# Patient Record
Sex: FEMALE | Race: WHITE | Employment: OTHER | URBAN - METROPOLITAN AREA
[De-identification: names, ages, dates, MRNs, and addresses within clinical notes are randomized per-mention and may not be internally consistent; named-entity substitution may affect disease eponyms.]

---

## 2017-06-26 ENCOUNTER — OFFICE VISIT (OUTPATIENT)
Dept: URGENT CARE | Facility: URGENT CARE | Age: 32
End: 2017-06-26
Payer: COMMERCIAL

## 2017-06-26 VITALS
DIASTOLIC BLOOD PRESSURE: 75 MMHG | TEMPERATURE: 98.2 F | SYSTOLIC BLOOD PRESSURE: 116 MMHG | WEIGHT: 195 LBS | HEART RATE: 91 BPM | BODY MASS INDEX: 34.21 KG/M2

## 2017-06-26 DIAGNOSIS — N39.0 URINARY TRACT INFECTION WITHOUT HEMATURIA, SITE UNSPECIFIED: Primary | ICD-10-CM

## 2017-06-26 DIAGNOSIS — R30.0 DYSURIA: ICD-10-CM

## 2017-06-26 DIAGNOSIS — R82.90 NONSPECIFIC FINDING ON EXAMINATION OF URINE: ICD-10-CM

## 2017-06-26 LAB
ALBUMIN UR-MCNC: NEGATIVE MG/DL
APPEARANCE UR: CLEAR
BACTERIA #/AREA URNS HPF: ABNORMAL /HPF
BILIRUB UR QL STRIP: NEGATIVE
COLOR UR AUTO: YELLOW
GLUCOSE UR STRIP-MCNC: NEGATIVE MG/DL
HGB UR QL STRIP: NEGATIVE
KETONES UR STRIP-MCNC: NEGATIVE MG/DL
LEUKOCYTE ESTERASE UR QL STRIP: NEGATIVE
NITRATE UR QL: POSITIVE
NON-SQ EPI CELLS #/AREA URNS LPF: ABNORMAL /LPF
PH UR STRIP: 6 PH (ref 5–7)
RBC #/AREA URNS AUTO: ABNORMAL /HPF (ref 0–2)
SP GR UR STRIP: 1.02 (ref 1–1.03)
URN SPEC COLLECT METH UR: ABNORMAL
UROBILINOGEN UR STRIP-ACNC: 0.2 EU/DL (ref 0.2–1)
WBC #/AREA URNS AUTO: ABNORMAL /HPF (ref 0–2)

## 2017-06-26 PROCEDURE — 87088 URINE BACTERIA CULTURE: CPT | Performed by: PHYSICIAN ASSISTANT

## 2017-06-26 PROCEDURE — 87186 SC STD MICRODIL/AGAR DIL: CPT | Performed by: PHYSICIAN ASSISTANT

## 2017-06-26 PROCEDURE — 99213 OFFICE O/P EST LOW 20 MIN: CPT | Performed by: PHYSICIAN ASSISTANT

## 2017-06-26 PROCEDURE — 81001 URINALYSIS AUTO W/SCOPE: CPT | Performed by: PHYSICIAN ASSISTANT

## 2017-06-26 PROCEDURE — 87086 URINE CULTURE/COLONY COUNT: CPT | Performed by: PHYSICIAN ASSISTANT

## 2017-06-26 RX ORDER — NITROFURANTOIN 25; 75 MG/1; MG/1
100 CAPSULE ORAL 2 TIMES DAILY
Qty: 10 CAPSULE | Refills: 0 | Status: SHIPPED | OUTPATIENT
Start: 2017-06-26 | End: 2017-07-03

## 2017-06-26 NOTE — PROGRESS NOTES
SUBJECTIVE:                                                    Monica Foley is a 32 year old female who presents to clinic today for the following health issues:      URINARY TRACT SYMPTOMS      Duration: X 2 weeks    Description  frequency, odor and burning when urinating    Intensity:  moderate    Accompanying signs and symptoms:  Fever/chills: no   Flank pain no   Nausea and vomiting: no   Vaginal symptoms: itching  Abdominal/Pelvic Pain: YES    History  History of frequent UTI's: YES, has had kidney infections as child  History of kidney stones: no   Sexually Active: YES  Possibility of pregnancy: No    Precipitating or alleviating factors: None    Therapies tried and outcome: none      CC: dysuria    SUBJECTIVE:   Monica Foley is a 32 year old female who  presents today for a possible UTI.   Symptoms of urgency, frequency, burning and nausea have been going on for 3day(s).    Characteristics include: gradual onset and moderate pain.    Hematuria no.    There is no history of fever, chills, or vomiting.    She has mild abdominal pain.  Patient denies flank pain and Vomiting, significant nausea or diarrhea or vaginal discharge     Burning started over the weekend.    Urine has an odor.  No concern for STI  Abdominal pain - she has known ovarian cysts as well, so not sure if it is related.       Past Medical History:   Diagnosis Date     RISHI (generalised anxiety disorder)      Grief 2014      suddenly from bee sting allergy     Current Outpatient Prescriptions   Medication Sig Dispense Refill     PARoxetine (PAXIL) 30 MG tablet Take 0.5 tablets (15 mg) by mouth every morning 45 tablet 3     acetaminophen-codeine (TYLENOL #3) 300-30 MG per tablet Take 1 tablet by mouth every 4 hours as needed for pain (Patient not taking: Reported on 2017) 18 tablet 0     Social History   Substance Use Topics     Smoking status: Current Every Day Smoker     Packs/day: 0.50      Types: Cigarettes     Smokeless tobacco: Never Used     Alcohol use Yes      Comment: occasional       ROS:  As in HPI    OBJECTIVE:  /75  Pulse 91  Temp 98.2  F (36.8  C) (Oral)  Wt 195 lb (88.5 kg)  BMI 34.21 kg/m2  GENERAL APPEARANCE: healthy, alert and no distress  RESP: lungs clear to auscultation - no rales, rhonchi or wheezes  CV: regular rates and rhythm, normal S1 S2, no murmur noted  ABDOMEN: soft, tenderness mild suprapubic, no guarding, no rebound  BACK: No CVA tenderness  SKIN: no suspicious lesions or rashes    ASSESSMENT/PLAN:   Urinary tract infection without hematuria, site unspecified  - nitrofurantoin, macrocrystal-monohydrate, (MACROBID) 100 MG capsule; Take 1 capsule (100 mg) by mouth 2 times daily for 7 days  Dispense: 10 capsule; Refill: 0  - Urine Culture Aerobic Bacterial - will call patient if bacteria is resistant to nitrofurantoin.     As per ordered above  Drink plenty of fluids.    Prevention and treatment of UTI's discussed.  Signs and symptoms of pyelonephritis mentioned.    Follow up with primary care physician if not improving, or if symptoms return after antibiotic is completed.     Camila Damian PA-C

## 2017-06-26 NOTE — NURSING NOTE
"Chief Complaint   Patient presents with     Dysuria       Initial /75  Pulse 91  Temp 98.2  F (36.8  C) (Oral)  Wt 195 lb (88.5 kg)  BMI 34.21 kg/m2 Estimated body mass index is 34.21 kg/(m^2) as calculated from the following:    Height as of 2/19/16: 5' 3.31\" (1.608 m).    Weight as of this encounter: 195 lb (88.5 kg).  Medication Reconciliation: complete   Emily Polk CMA      "

## 2017-06-26 NOTE — MR AVS SNAPSHOT
After Visit Summary   6/26/2017    Monica Foley    MRN: [id number]           Patient Information     Date Of Birth          1985        Visit Information        Provider Department      6/26/2017 5:05 PM Camila Damian PA-C [facility]        Today's Diagnoses     Urinary tract infection without hematuria, site unspecified    -  1    Dysuria        Nonspecific finding on examination of urine           Follow-ups after your visit        Who to contact     If you have questions or need follow up information about today's clinic visit or your schedule please contact [facility] directly at [phone].  Normal or non-critical lab and imaging results will be communicated to you by Kinematixhart, letter or phone within 4 business days after the clinic has received the results. If you do not hear from us within 7 days, please contact the clinic through Kinematixhart or phone. If you have a critical or abnormal lab result, we will notify you by phone as soon as possible.  Submit refill requests through [employer] or call your pharmacy and they will forward the refill request to us. Please allow 3 business days for your refill to be completed.          Additional Information About Your Visit        MyChart Information     [employer] gives you secure access to your electronic health record. If you see a primary care provider, you can also send messages to your care team and make appointments. If you have questions, please call your primary care clinic.  If you do not have a primary care provider, please call [phone] and they will assist you.        Care EveryWhere ID     This is your Care EveryWhere ID. This could be used by other organizations to access your [facility] medical records  [id number]        Your Vitals Were     Pulse Temperature BMI (Body Mass Index)             91 98.2  F (36.8  C) (Oral) 34.21 kg/m2          Blood Pressure from Last 3 Encounters:    06/26/17 116/75   12/04/16 121/71   02/19/16 104/66    Weight from Last 3 Encounters:   06/26/17 195 lb (88.5 kg)   12/04/16 193 lb 6.4 oz (87.7 kg)   02/19/16 186 lb 12.8 oz (84.7 kg)              We Performed the Following     *UA reflex to Microscopic and Culture ([place] and [facility] (except [place] and [place])     Urine Culture Aerobic Bacterial     Urine Microscopic          Today's Medication Changes          These changes are accurate as of: 6/26/17  6:23 PM.  If you have any questions, ask your nurse or doctor.               Start taking these medicines.        Dose/Directions    nitrofurantoin (macrocrystal-monohydrate) 100 MG capsule   Commonly known as:  MACROBID   Used for:  Urinary tract infection without hematuria, site unspecified        Dose:  100 mg   Take 1 capsule (100 mg) by mouth 2 times daily for 7 days   Quantity:  10 capsule   Refills:  0            Where to get your medicines      These medications were sent to [facility] [address] [place] [address] [address] [facility]  [address] [facility] [address]     Phone:  [phone]     nitrofurantoin (macrocrystal-monohydrate) 100 MG capsule                Primary Care Provider    [facility] Other Clinic                Equal Access to Services     BARBIE MALAVE AH: Donal antoineo Sorichali, waaxda luqadaha, qaybta kaalmada adeegyada, roberta contreras. So [facility] [phone].    ATENCIÓN: Si habla español, tiene a espinoza disposición servicios gratuitos de asistencia lingüística. Llame al [phone].    We comply with applicable federal civil rights laws and [place] laws. We do not discriminate on the basis of race, color, national origin, age, disability sex, sexual orientation or gender identity.            Thank you!     Thank you for choosing [facility]  for your care. Our goal is always to provide you with excellent care. Hearing back from our patients is one way we can continue to improve  our services. Please take a few minutes to complete the written survey that you may receive in the mail after your visit with us. Thank you!             Your Updated Medication List - Protect others around you: Learn how to safely use, store and throw away your medicines at www.disposemymeds.org.          This list is accurate as of: 6/26/17  6:23 PM.  Always use your most recent med list.                   Brand Name Dispense Instructions for use Diagnosis    acetaminophen-codeine 300-30 MG per tablet    TYLENOL #3    18 tablet    Take 1 tablet by mouth every 4 hours as needed for pain    Acute left-sided low back pain with left-sided sciatica       nitrofurantoin (macrocrystal-monohydrate) 100 MG capsule    MACROBID    10 capsule    Take 1 capsule (100 mg) by mouth 2 times daily for 7 days    Urinary tract infection without hematuria, site unspecified       PARoxetine 30 MG tablet    PAXIL    45 tablet    Take 0.5 tablets (15 mg) by mouth every morning    Depression

## 2017-06-26 NOTE — Clinical Note
NANCY Loyola, Update the note-- says no vaginal or penile discharge :) I would recommend tmp/smx as 1st line for most UTI-- it's bacteriocidal (macrobid is bacteriostatic). Also, most pts with PCN allergy are not truly pcn allergic. They can have a consult with our allergist to determine if really allergic, and I do recommend this. Jessica

## 2017-06-29 LAB
BACTERIA SPEC CULT: ABNORMAL
MICRO REPORT STATUS: ABNORMAL
MICROORGANISM SPEC CULT: ABNORMAL
MICROORGANISM SPEC CULT: ABNORMAL
SPECIMEN SOURCE: ABNORMAL

## 2017-07-07 NOTE — PROGRESS NOTES
Chart reviewed.  Encounter was reviewed with provider.  Patient was not examined by me.  Jennifer Cabezas MD

## 2017-10-29 ENCOUNTER — OFFICE VISIT (OUTPATIENT)
Dept: URGENT CARE | Facility: URGENT CARE | Age: 32
End: 2017-10-29
Payer: COMMERCIAL

## 2017-10-29 VITALS
WEIGHT: 187.4 LBS | TEMPERATURE: 98.3 F | SYSTOLIC BLOOD PRESSURE: 121 MMHG | BODY MASS INDEX: 32.88 KG/M2 | HEART RATE: 86 BPM | DIASTOLIC BLOOD PRESSURE: 75 MMHG | OXYGEN SATURATION: 98 %

## 2017-10-29 DIAGNOSIS — R82.90 NONSPECIFIC FINDING ON EXAMINATION OF URINE: ICD-10-CM

## 2017-10-29 DIAGNOSIS — R30.0 DYSURIA: Primary | ICD-10-CM

## 2017-10-29 LAB
ALBUMIN UR-MCNC: NEGATIVE MG/DL
APPEARANCE UR: CLEAR
BACTERIA #/AREA URNS HPF: ABNORMAL /HPF
BILIRUB UR QL STRIP: NEGATIVE
COLOR UR AUTO: YELLOW
GLUCOSE UR STRIP-MCNC: NEGATIVE MG/DL
HGB UR QL STRIP: NEGATIVE
KETONES UR STRIP-MCNC: NEGATIVE MG/DL
LEUKOCYTE ESTERASE UR QL STRIP: NEGATIVE
NITRATE UR QL: POSITIVE
NON-SQ EPI CELLS #/AREA URNS LPF: ABNORMAL /LPF
PH UR STRIP: 6 PH (ref 5–7)
RBC #/AREA URNS AUTO: ABNORMAL /HPF
SOURCE: ABNORMAL
SP GR UR STRIP: 1.01 (ref 1–1.03)
SPECIMEN SOURCE: NORMAL
UROBILINOGEN UR STRIP-ACNC: 0.2 EU/DL (ref 0.2–1)
WBC #/AREA URNS AUTO: ABNORMAL /HPF
WET PREP SPEC: NORMAL

## 2017-10-29 PROCEDURE — 87088 URINE BACTERIA CULTURE: CPT | Performed by: NURSE PRACTITIONER

## 2017-10-29 PROCEDURE — 81001 URINALYSIS AUTO W/SCOPE: CPT | Performed by: NURSE PRACTITIONER

## 2017-10-29 PROCEDURE — 87210 SMEAR WET MOUNT SALINE/INK: CPT | Performed by: NURSE PRACTITIONER

## 2017-10-29 PROCEDURE — 87086 URINE CULTURE/COLONY COUNT: CPT | Performed by: NURSE PRACTITIONER

## 2017-10-29 PROCEDURE — 87186 SC STD MICRODIL/AGAR DIL: CPT | Performed by: NURSE PRACTITIONER

## 2017-10-29 PROCEDURE — 99213 OFFICE O/P EST LOW 20 MIN: CPT | Performed by: NURSE PRACTITIONER

## 2017-10-29 NOTE — LETTER
November 1, 2017    Monica Foley  [address]  [place] [address]      Dear Monica,    We have been trying to reach you by phone .    Your urine culture was positive for 2 types of bacteria. Bactrim DS antibiotic should cover both.  Take one tablet 2 times a day for 7 days, #14. Call us back so we can send it to the pharmacy of your choice. Follow up with your Primary after treatment to ensure resolution with a repeat urine test.        Nakita Lakhani PA-C    Results for orders placed or performed in visit on 10/29/17   *UA reflex to Microscopic and Culture ([place] and [facility] (except [place] and [place])   Result Value Ref Range    Color Urine Yellow     Appearance Urine Clear     Glucose Urine Negative NEG^Negative mg/dL    Bilirubin Urine Negative NEG^Negative    Ketones Urine Negative NEG^Negative mg/dL    Specific Gravity Urine 1.010 1.003 - 1.035    Blood Urine Negative NEG^Negative    pH Urine 6.0 5.0 - 7.0 pH    Protein Albumin Urine Negative NEG^Negative mg/dL    Urobilinogen Urine 0.2 0.2 - 1.0 EU/dL    Nitrite Urine Positive (A) NEG^Negative    Leukocyte Esterase Urine Negative NEG^Negative    Source Midstream Urine    Urine Microscopic   Result Value Ref Range    WBC Urine O - 2 OTO2^O - 2 /HPF    RBC Urine O - 2 OTO2^O - 2 /HPF    Squamous Epithelial /LPF Urine Moderate (A) FEW^Few /LPF    Bacteria Urine Many (A) NEG^Negative /HPF   Urine Culture Aerobic Bacterial   Result Value Ref Range    Specimen Description Midstream Urine     Culture Micro >100,000 colonies/mL  Escherichia coli   (A)     Culture Micro (A)      50,000 to 100,000 colonies/mL  Klebsiella oxytoca      Culture Micro       <10,000 colonies/mL  urogenital tulio  Susceptibility testing not routinely done         Susceptibility    Escherichia coli - YESICA     AMPICILLIN <=2 Sensitive ug/mL     CEFAZOLIN* <=4 Sensitive ug/mL      * Cefazolin YESICA breakpoints are for the treatment of uncomplicated urinary tract  infections.  For the treatment of systemic infections, please contact the laboratory for additional testing.     CEFOXITIN <=4 Sensitive ug/mL     CEFTAZIDIME <=1 Sensitive ug/mL     CEFTRIAXONE <=1 Sensitive ug/mL     CIPROFLOXACIN <=0.25 Sensitive ug/mL     GENTAMICIN <=1 Sensitive ug/mL     LEVOFLOXACIN <=0.12 Sensitive ug/mL     NITROFURANTOIN <=16 Sensitive ug/mL     TOBRAMYCIN <=1 Sensitive ug/mL     Trimethoprim/Sulfa <=1/19 Sensitive ug/mL     AMPICILLIN/SULBACTAM <=2 Sensitive ug/mL     Piperacillin/Tazo <=4 Sensitive ug/mL     CEFEPIME <=1 Sensitive ug/mL    Klebsiella oxytoca - YESICA     AMPICILLIN >=32 Resistant ug/mL     CEFAZOLIN* >=64 Resistant ug/mL      * Cefazolin YESICA breakpoints are for the treatment of uncomplicated urinary tract infections.  For the treatment of systemic infections, please contact the laboratory for additional testing.Cefazolin YESICA breakpoints are for the treatment of uncomplicated urinary tract infections.  For the treatment of systemic infections, please contact the laboratory for additional testing.     CEFOXITIN <=4 Sensitive ug/mL     CEFTAZIDIME <=1 Sensitive ug/mL     CEFTRIAXONE <=1 Sensitive ug/mL     CIPROFLOXACIN <=0.25 Sensitive ug/mL     GENTAMICIN <=1 Sensitive ug/mL     LEVOFLOXACIN <=0.12 Sensitive ug/mL     NITROFURANTOIN <=16 Sensitive ug/mL     TOBRAMYCIN <=1 Sensitive ug/mL     Trimethoprim/Sulfa <=1/19 Sensitive ug/mL     AMPICILLIN/SULBACTAM 8 Sensitive ug/mL     Piperacillin/Tazo <=4 Sensitive ug/mL     CEFEPIME <=1 Sensitive ug/mL   Wet prep   Result Value Ref Range    Specimen Description Vagina     Wet Prep No Trichomonas seen     Wet Prep No clue cells seen     Wet Prep No yeast seen

## 2017-10-29 NOTE — MR AVS SNAPSHOT
After Visit Summary   10/29/2017    Monica Foley    MRN: [id number]           Patient Information     Date Of Birth          1985        Visit Information        Provider Department      10/29/2017 10:05 AM Janel Parnell APRN CNP [facility]        Today's Diagnoses     Dysuria    -  1    Nonspecific finding on examination of urine           Follow-ups after your visit        Who to contact     If you have questions or need follow up information about today's clinic visit or your schedule please contact [facility] [place] [facility] directly at [phone].  Normal or non-critical lab and imaging results will be communicated to you by Evaporcoolhart, letter or phone within 4 business days after the clinic has received the results. If you do not hear from us within 7 days, please contact the clinic through Evaporcoolhart or phone. If you have a critical or abnormal lab result, we will notify you by phone as soon as possible.  Submit refill requests through [employer] or call your pharmacy and they will forward the refill request to us. Please allow 3 business days for your refill to be completed.          Additional Information About Your Visit        MyChart Information     [employer] gives you secure access to your electronic health record. If you see a primary care provider, you can also send messages to your care team and make appointments. If you have questions, please call your primary care clinic.  If you do not have a primary care provider, please call [phone] and they will assist you.        Care EveryWhere ID     This is your Care EveryWhere ID. This could be used by other organizations to access your [place] medical records  [id number]        Your Vitals Were     Pulse Temperature Pulse Oximetry BMI (Body Mass Index)          86 98.3  F (36.8  C) (Oral) 98% 32.88 kg/m2         Blood Pressure from Last 3 Encounters:   10/29/17 121/75   06/26/17 116/75   12/04/16 121/71     Weight from Last 3 Encounters:   10/29/17 187 lb 6.4 oz (85 kg)   06/26/17 195 lb (88.5 kg)   12/04/16 193 lb 6.4 oz (87.7 kg)              We Performed the Following     *UA reflex to Microscopic and Culture ([place] and [facility] (except [place] and [place])     Urine Culture Aerobic Bacterial     Urine Microscopic     Wet prep        Primary Care Provider    None Specified       No primary provider on file.        Equal Access to Services     BARBIE MALAVE : Hadmelyssa Hodges, wacaydenda jesse, qaclaudiata kaalmada nicci, roberta cr . So [facility] [phone].    ATENCIÓN: Si candelariola monica, tiene a espinoza disposición servicios gratuitos de asistencia lingüística. Llame al [phone].    We comply with applicable federal civil rights laws and [place] laws. We do not discriminate on the basis of race, color, national origin, age, disability, sex, sexual orientation, or gender identity.            Thank you!     [facility]. Our goal is always to provide you with excellent care. Hearing back from our patients is one way we can continue to improve our services. Please take a few minutes to complete the written survey that you may receive in the mail after your visit with us. Thank you!             Your Updated Medication List - Protect others around you: Learn how to safely use, store and throw away your medicines at www.disposemymeds.org.          This list is accurate as of: 10/29/17 11:41 AM.  Always use your most recent med list.                   Brand Name Dispense Instructions for use Diagnosis    PARoxetine 30 MG tablet    PAXIL    45 tablet    Take 0.5 tablets (15 mg) by mouth every morning    Depression

## 2017-10-29 NOTE — PROGRESS NOTES
SUBJECTIVE:                                                    Monica Foley is a 32 year old female who presents to clinic today for the following health issues:    URINARY TRACT SYMPTOMS      Duration: had UTI a month ago and unsure if it every went away. Got better, now symptoms last few days.    Description  dysuria, frequency, urgency, odor, hesitancy, retention, vaginal discharge    Intensity:  mild    Accompanying signs and symptoms:  Fever/chills: no  Flank pain no  Nausea and vomiting: no  Vaginal symptoms: odor and itching  Abdominal/Pelvic Pain: no    History  History of frequent UTI's: no  History of kidney stones: no   Sexually Active: YES, no std concerns  Possibility of pregnancy: took home test couple weeks ago-negative    Precipitating or alleviating factors: not drinking enough water, some pain during intercourser, pain when urinating after intercourse    Therapies tried and outcome: course of antibiotics -, cranberry juice and water   Outcome: no help    Problem list and histories reviewed & adjusted, as indicated.  Additional history: as documented    Patient Active Problem List   Diagnosis     CARDIOVASCULAR SCREENING; LDL GOAL LESS THAN 160     Generalized anxiety disorder     Grief     Adjustment disorder with depressed mood     Major depressive disorder, single episode     Tobacco use disorder     Past Surgical History:   Procedure Laterality Date      SECTION  ,        Social History   Substance Use Topics     Smoking status: Current Every Day Smoker     Packs/day: 0.50     Types: Cigarettes     Smokeless tobacco: Never Used     Alcohol use Yes      Comment: occasional     Family History   Problem Relation Age of Onset     DIABETES Paternal Grandfather      Hypertension No family hx of      Hyperlipidemia No family hx of      Breast Cancer No family hx of      Prostate Cancer No family hx of      Other Cancer No family hx of      Depression/Anxiety No family  hx of      CEREBROVASCULAR DISEASE No family hx of      Anesthesia Reaction No family hx of      Thyroid Disease No family hx of      Chemical Addiction No family hx of      Known Genetic Syndrome No family hx of      Obesity No family hx of      Depression No family hx of      Anxiety Disorder No family hx of      MENTAL ILLNESS No family hx of      Substance Abuse No family hx of      Asthma No family hx of              ROS:  Constitutional, HEENT, cardiovascular, pulmonary, gi and gu systems are negative, except as otherwise noted.      OBJECTIVE:   /75  Pulse 86  Temp 98.3  F (36.8  C) (Oral)  Wt 187 lb 6.4 oz (85 kg)  SpO2 98%  BMI 32.88 kg/m2  Body mass index is 32.88 kg/(m^2).  GENERAL: alert and no distress  RESP: lungs clear to auscultation - no rales, rhonchi or wheezes  CV: regular rate and rhythm, normal S1 S2, no S3 or S4, no murmur, click or rub, no peripheral edema and peripheral pulses strong  ABDOMEN: soft, nontender, no hepatosplenomegaly, no masses and bowel sounds normal. No CVA tenderness  SKIN: no suspicious lesions or rashes    Diagnostic Test Results:  Results for orders placed or performed in visit on 10/29/17 (from the past 24 hour(s))   *UA reflex to Microscopic and Culture ([place] and [facility] (except [place] and [place])   Result Value Ref Range    Color Urine Yellow     Appearance Urine Clear     Glucose Urine Negative NEG^Negative mg/dL    Bilirubin Urine Negative NEG^Negative    Ketones Urine Negative NEG^Negative mg/dL    Specific Gravity Urine 1.010 1.003 - 1.035    Blood Urine Negative NEG^Negative    pH Urine 6.0 5.0 - 7.0 pH    Protein Albumin Urine Negative NEG^Negative mg/dL    Urobilinogen Urine 0.2 0.2 - 1.0 EU/dL    Nitrite Urine Positive (A) NEG^Negative    Leukocyte Esterase Urine Negative NEG^Negative    Source Midstream Urine    Urine Microscopic   Result Value Ref Range    WBC Urine O - 2 OTO2^O - 2 /HPF    RBC Urine O - 2 OTO2^O - 2 /HPF     Squamous Epithelial /LPF Urine Moderate (A) FEW^Few /LPF    Bacteria Urine Many (A) NEG^Negative /HPF   Wet prep   Result Value Ref Range    Specimen Description Vagina     Wet Prep No Trichomonas seen     Wet Prep No clue cells seen     Wet Prep No yeast seen        ASSESSMENT/PLAN:       1. Dysuria  Urine does not show uti. I do not suspect pyelo ( no fever, nausea, vomiting, chills, flank pain, hematuria) Wet prep negative as well   She is hydrated, nontoxic pain is mild intermittent  Will perform urine culture and call if needs antibiotic treatment    2. Nonspecific finding on examination of urine  - Urine Culture Aerobic Bacterial    Given patient instructions-push fluids, monitor symptoms. Call or rtc if worsening or not improving  Red flag symptoms as discussed to ER    PRADEEP Rodriguez [facility]

## 2017-10-29 NOTE — NURSING NOTE
"Chief Complaint   Patient presents with     UTI       Initial /75  Pulse 86  Temp 98.3  F (36.8  C) (Oral)  Wt 187 lb 6.4 oz (85 kg)  SpO2 98%  BMI 32.88 kg/m2 Estimated body mass index is 32.88 kg/(m^2) as calculated from the following:    Height as of 2/19/16: 5' 3.31\" (1.608 m).    Weight as of this encounter: 187 lb 6.4 oz (85 kg).  Medication Reconciliation: complete    Sharmaine Zhong CMA  "

## 2017-10-30 ENCOUNTER — TELEPHONE (OUTPATIENT)
Dept: FAMILY MEDICINE | Facility: CLINIC | Age: 32
End: 2017-10-30

## 2017-10-30 RX ORDER — SULFAMETHOXAZOLE/TRIMETHOPRIM 800-160 MG
1 TABLET ORAL 2 TIMES DAILY
Qty: 14 TABLET | Refills: 0 | Status: CANCELLED | OUTPATIENT
Start: 2017-10-30 | End: 2017-11-06

## 2017-10-30 NOTE — TELEPHONE ENCOUNTER
Notes Recorded by Pritesh Hobson MD on 10/30/2017 at 2:43 PM    Urine culture positive, sensitivity pending.     Start bactrim DS bid for 7 days while waiting for sensitivity

## 2017-10-30 NOTE — TELEPHONE ENCOUNTER
"Called patient at home number and got recording that the phone \"number is temporarily not in service\".  Will attempt again later.     Ainsley Monroy RN      "

## 2017-10-31 LAB
BACTERIA SPEC CULT: ABNORMAL
SPECIMEN SOURCE: ABNORMAL

## 2017-11-04 ENCOUNTER — NURSE TRIAGE (OUTPATIENT)
Dept: NURSING | Facility: CLINIC | Age: 32
End: 2017-11-04

## 2017-11-04 ENCOUNTER — TELEPHONE (OUTPATIENT)
Dept: URGENT CARE | Facility: URGENT CARE | Age: 32
End: 2017-11-04

## 2017-11-04 DIAGNOSIS — R30.0 DYSURIA: Primary | ICD-10-CM

## 2017-11-04 NOTE — TELEPHONE ENCOUNTER
"Patient was seen at [facility] on 10/29, diagnosed with a UTI. Urine culture came back positive.  staff unable to reach Patient by phone, so patient was sent a certified letter telling her to call the urgent care to get treatment for her positive urine culture.   Patient states that she would prefer not to get Bactrim prescribed as noted EPIC. She states that when she has taken that before she felt \"out of it\" \"like I was in a dream\" \"dizzy\".  States she has been on Macrobid before and would prefer to get that if she could.  She uses the [facility] pharmacy in [place].     This RN called the [facility] and spoke with staff member Solange.  Solange would like a telephone encounter sent to them. They will the review the message with the provider at the urgent care and call the Patient back at [phone]    This information was given to the Patient who is comfortable with this plan.    Reason for Disposition    [1] Follow-up call from patient regarding patient's clinical status AND [2] information urgent    Protocols used: PCP CALL - NO TRIAGE-ADULT-    "

## 2017-11-04 NOTE — TELEPHONE ENCOUNTER
"Clinic Action Needed:Yes  FNA Triage Call  Presenting Problem:    Patient was seen at [facility] on 10/29, diagnosed with a UTI. Urine culture came back positive.  staff unable to reach Patient by phone, so patient was sent a certified letter telling her to call the urgent care to get treatment for her positive urine culture.   Patient states that she would prefer not to get Bactrim prescribed as noted EPIC. She states that when she has taken that before she felt \"out of it\" \"like I was in a dream\" \"dizzy\".  States she has been on Macrobid before and would prefer to get that if she could.  She uses the [facility] pharmacy in [place].     This RN called the [facility] and spoke with staff member Solange.  Solange would like a telephone encounter sent to them. They will the review the message with the provider at the urgent care and call the Patient back at [phone]    This information was given to the Patient who is comfortable with this plan.      Routed to:[facility] nurse oswald Segal RN/ [facility]        "

## 2017-11-05 ENCOUNTER — NURSE TRIAGE (OUTPATIENT)
Dept: NURSING | Facility: CLINIC | Age: 32
End: 2017-11-05

## 2017-11-05 RX ORDER — NITROFURANTOIN 25; 75 MG/1; MG/1
100 CAPSULE ORAL 2 TIMES DAILY
Qty: 14 CAPSULE | Refills: 0 | Status: CANCELLED | OUTPATIENT
Start: 2017-11-05

## 2017-11-05 RX ORDER — CIPROFLOXACIN 500 MG/1
500 TABLET, FILM COATED ORAL 2 TIMES DAILY
Qty: 14 TABLET | Refills: 0 | Status: SHIPPED | OUTPATIENT
Start: 2017-11-05 | End: 2017-11-12

## 2017-11-05 NOTE — TELEPHONE ENCOUNTER
Tried calling unable to reach. Need to verify lactation status before sending macrobid  Danika Ewing M.D.

## 2017-11-05 NOTE — TELEPHONE ENCOUNTER
Patient returning call to  provider.  Caller was transferred over to the [facility] and connected with nurse Narvaez.    Susi Segal RN/ [facility]

## 2017-11-05 NOTE — TELEPHONE ENCOUNTER
Patient called back and was disconnected when FNA tried to connect phone call.    When patient calls back need to see if she is breast feeding and LMP.    JAZ Cotter  11/5/2017 10:49 AM

## 2017-11-05 NOTE — TELEPHONE ENCOUNTER
Called patient back.  Patient not pregnant or breastfeeding.  Patient complained of some left lumbar back pain - she's not sure if muscle related or not.  If patient unsure if muscle related recommend coming back in to be evaluated.  She states she wants to observe for now.   Aware to come back if worsening symptoms or no relief. Come in asap if with fevers nausea vomiting or flank pain.  To be safe- will prescribed with ciprofloxacin instead to cover pyelonephritis  Prescribed with ciprofloxacin . Side effects discussed. Aware of the risks of increased tendon rupture with fluoroquinolones, especially if used with steroids.

## 2017-11-06 ENCOUNTER — TELEPHONE (OUTPATIENT)
Dept: URGENT CARE | Facility: URGENT CARE | Age: 32
End: 2017-11-06

## 2017-11-06 NOTE — TELEPHONE ENCOUNTER
Patient was seen in UC over the weekend, and had questions regarding a medication that was prescribed. Please call and advise. Thank you. [phone]

## 2017-11-06 NOTE — TELEPHONE ENCOUNTER
Patient prescribed Cipro, looked at the side effects - torn ligaments, etc. Patient concerned about the side effects listed. Did review with patient common side effects. If develops any side effects, to call clinic immediately.  .Risa CHE, RN, CPN

## 2017-11-08 ENCOUNTER — NURSE TRIAGE (OUTPATIENT)
Dept: NURSING | Facility: CLINIC | Age: 32
End: 2017-11-08

## 2017-11-08 NOTE — TELEPHONE ENCOUNTER
Clinic Action Needed:No  Reason for Call: Monica reports that she was seen in UC last week and dx with a UTI and prescribed Cipro.  She was concerned about side effects of Cipro and states she doesn't like taking medication so she has not started the abx.  Today she is c/o feeling lightheaded, flank pain and nauseated.  Advised that it's concerning that she didn't treat UTI with prescribed abx, and due to symptoms tonight would like her reassessed in person at UC/ER to make sure that Cipro is the appropriate treatment.  Need to r/o kidney and/or advancing infection.  Caller appears to understand directives.  Will go into UC, advised that if she is running fever or starts to vomit go to ER.   Routed to: Not routed.    Lisa Boyer RN  [facility]

## 2017-12-04 ENCOUNTER — TELEPHONE (OUTPATIENT)
Dept: URGENT CARE | Facility: URGENT CARE | Age: 32
End: 2017-12-04

## 2017-12-04 NOTE — TELEPHONE ENCOUNTER
Pt called in regards to questions/concerns with her bladder infection. Please call and advise. Thank you [phone]

## 2017-12-04 NOTE — TELEPHONE ENCOUNTER
Left message on voicemail for patient to call back.   Direct number given to call back: [phone].     Ainsley Monroy RN

## 2017-12-04 NOTE — TELEPHONE ENCOUNTER
Patient called back.     Patient reports that her son was recently diagnosed with strep throat and patient went in and got tested and she also had strep.  She wonders if this could have anything to do with her recurrent UTIs.  Per urine culture done 10/29/17 patient did not have strep in her urine, she grew out klebsiellla and e. Coli. Informed patient of this.     Patient nervous about having group B strep.  Informed her unless she is pregnant, we would treat this bacteria in urine the same as any UTI; with an antibiotic but she did not have this in her urine.  Patient reporting that she is still having UTI symptoms. Advised patient to be seen again. Offered urgent care tonight but patient cannot come in. Prefers appointment tomorrow.     Made appointment for 12/5/17 with Dr Emily Ramirez.     Ainsley Monroy RN

## 2017-12-14 ENCOUNTER — OFFICE VISIT (OUTPATIENT)
Dept: FAMILY MEDICINE | Facility: CLINIC | Age: 32
End: 2017-12-14
Payer: COMMERCIAL

## 2017-12-14 ENCOUNTER — TELEPHONE (OUTPATIENT)
Dept: FAMILY MEDICINE | Facility: CLINIC | Age: 32
End: 2017-12-14

## 2017-12-14 ENCOUNTER — NURSE TRIAGE (OUTPATIENT)
Dept: NURSING | Facility: CLINIC | Age: 32
End: 2017-12-14

## 2017-12-14 VITALS
OXYGEN SATURATION: 99 % | DIASTOLIC BLOOD PRESSURE: 68 MMHG | BODY MASS INDEX: 33.51 KG/M2 | HEART RATE: 91 BPM | SYSTOLIC BLOOD PRESSURE: 105 MMHG | WEIGHT: 191 LBS | RESPIRATION RATE: 15 BRPM | TEMPERATURE: 97.7 F

## 2017-12-14 DIAGNOSIS — J02.0 STREP THROAT: Primary | ICD-10-CM

## 2017-12-14 DIAGNOSIS — R82.90 NONSPECIFIC FINDING ON EXAMINATION OF URINE: ICD-10-CM

## 2017-12-14 DIAGNOSIS — R30.0 DYSURIA: ICD-10-CM

## 2017-12-14 DIAGNOSIS — H66.001 ACUTE SUPPURATIVE OTITIS MEDIA OF RIGHT EAR WITHOUT SPONTANEOUS RUPTURE OF TYMPANIC MEMBRANE, RECURRENCE NOT SPECIFIED: ICD-10-CM

## 2017-12-14 DIAGNOSIS — F17.200 TOBACCO USE DISORDER: ICD-10-CM

## 2017-12-14 LAB
ALBUMIN UR-MCNC: NEGATIVE MG/DL
APPEARANCE UR: CLEAR
BACTERIA #/AREA URNS HPF: ABNORMAL /HPF
BILIRUB UR QL STRIP: NEGATIVE
COLOR UR AUTO: YELLOW
GLUCOSE UR STRIP-MCNC: NEGATIVE MG/DL
HGB UR QL STRIP: NEGATIVE
KETONES UR STRIP-MCNC: NEGATIVE MG/DL
LEUKOCYTE ESTERASE UR QL STRIP: NEGATIVE
NITRATE UR QL: POSITIVE
NON-SQ EPI CELLS #/AREA URNS LPF: ABNORMAL /LPF
PH UR STRIP: 6.5 PH (ref 5–7)
RBC #/AREA URNS AUTO: ABNORMAL /HPF
SOURCE: ABNORMAL
SP GR UR STRIP: 1.01 (ref 1–1.03)
UROBILINOGEN UR STRIP-ACNC: 0.2 EU/DL (ref 0.2–1)
WBC #/AREA URNS AUTO: ABNORMAL /HPF

## 2017-12-14 PROCEDURE — 87088 URINE BACTERIA CULTURE: CPT | Performed by: FAMILY MEDICINE

## 2017-12-14 PROCEDURE — 99214 OFFICE O/P EST MOD 30 MIN: CPT | Performed by: FAMILY MEDICINE

## 2017-12-14 PROCEDURE — 81001 URINALYSIS AUTO W/SCOPE: CPT | Performed by: FAMILY MEDICINE

## 2017-12-14 PROCEDURE — 87186 SC STD MICRODIL/AGAR DIL: CPT | Performed by: FAMILY MEDICINE

## 2017-12-14 PROCEDURE — 87086 URINE CULTURE/COLONY COUNT: CPT | Performed by: FAMILY MEDICINE

## 2017-12-14 RX ORDER — AZITHROMYCIN 250 MG/1
TABLET, FILM COATED ORAL
Refills: 0 | COMMUNITY
Start: 2017-12-12 | End: 2017-12-14 | Stop reason: ALTCHOICE

## 2017-12-14 RX ORDER — LEVOFLOXACIN 500 MG/1
500 TABLET, FILM COATED ORAL DAILY
Qty: 7 TABLET | Refills: 0 | Status: SHIPPED | OUTPATIENT
Start: 2017-12-14

## 2017-12-14 RX ORDER — SULFAMETHOXAZOLE/TRIMETHOPRIM 800-160 MG
TABLET ORAL
Refills: 0 | COMMUNITY
Start: 2017-12-12 | End: 2017-12-14

## 2017-12-14 ASSESSMENT — ANXIETY QUESTIONNAIRES
1. FEELING NERVOUS, ANXIOUS, OR ON EDGE: SEVERAL DAYS
2. NOT BEING ABLE TO STOP OR CONTROL WORRYING: NOT AT ALL
IF YOU CHECKED OFF ANY PROBLEMS ON THIS QUESTIONNAIRE, HOW DIFFICULT HAVE THESE PROBLEMS MADE IT FOR YOU TO DO YOUR WORK, TAKE CARE OF THINGS AT HOME, OR GET ALONG WITH OTHER PEOPLE: NOT DIFFICULT AT ALL
GAD7 TOTAL SCORE: 5
6. BECOMING EASILY ANNOYED OR IRRITABLE: SEVERAL DAYS
3. WORRYING TOO MUCH ABOUT DIFFERENT THINGS: SEVERAL DAYS
7. FEELING AFRAID AS IF SOMETHING AWFUL MIGHT HAPPEN: NOT AT ALL
5. BEING SO RESTLESS THAT IT IS HARD TO SIT STILL: SEVERAL DAYS

## 2017-12-14 ASSESSMENT — PATIENT HEALTH QUESTIONNAIRE - PHQ9
SUM OF ALL RESPONSES TO PHQ QUESTIONS 1-9: 9
5. POOR APPETITE OR OVEREATING: SEVERAL DAYS

## 2017-12-14 NOTE — LETTER
My Depression Action Plan  Name: Monica Foley   Date of Birth 1985  Date: 12/14/2017    My doctor: Clinic, [place]   My clinic: [facility]  [address] [address] [phone]          GREEN    ZONE   Good Control    What it looks like:     Things are going generally well. You have normal up s and down s. You may even feel depressed from time to time, but bad moods usually last less than a day.   What you need to do:  1. Continue to care for yourself (see self care plan)  2. Check your depression survival kit and update it as needed  3. Follow your physician s recommendations including any medication.  4. Do not stop taking medication unless you consult with your physician first.           YELLOW         ZONE Getting Worse    What it looks like:     Depression is starting to interfere with your life.     It may be hard to get out of bed; you may be starting to isolate yourself from others.    Symptoms of depression are starting to last most all day and this has happened for several days.     You may have suicidal thoughts but they are not constant.   What you need to do:     1. Call your care team, your response to treatment will improve if you keep your care team informed of your progress. Yellow periods are signs an adjustment may need to be made.     2. Continue your self-care, even if you have to fake it!    3. Talk to someone in your support network    4. Open up your depression survival kit           RED    ZONE Medical Alert - Get Help    What it looks like:     Depression is seriously interfering with your life.     You may experience these or other symptoms: You can t get out of bed most days, can t work or engage in other necessary activities, you have trouble taking care of basic hygiene, or basic responsibilities, thoughts of suicide or death that will not go away, self-injurious behavior.     What you need to do:  1. Call your  care team and request a same-day appointment. If they are not available (weekends or after hours) call your local crisis line, emergency room or 911.      Electronically signed by: Valarie Engel, December 14, 2017    Depression Self Care Plan / Survival Kit    Self-Care for Depression  Here s the deal. Your body and mind are really not as separate as most people think.  What you do and think affects how you feel and how you feel influences what you do and think. This means if you do things that people who feel good do, it will help you feel better.  Sometimes this is all it takes.  There is also a place for medication and therapy depending on how severe your depression is, so be sure to consult with your medical provider and/ or Behavioral Health Consultant if your symptoms are worsening or not improving.     In order to better manage my stress, I will:    Exercise  Get some form of exercise, every day. This will help reduce pain and release endorphins, the  feel good  chemicals in your brain. This is almost as good as taking antidepressants!  This is not the same as joining a gym and then never going! (they count on that by the way ) It can be as simple as just going for a walk or doing some gardening, anything that will get you moving.      Hygiene   Maintain good hygiene (Get out of bed in the morning, Make your bed, Brush your teeth, Take a shower, and Get dressed like you were going to work, even if you are unemployed).  If your clothes don't fit try to get ones that do.    Diet  I will strive to eat foods that are good for me, drink plenty of water, and avoid excessive sugar, caffeine, alcohol, and other mood-altering substances.  Some foods that are helpful in depression are: complex carbohydrates, B vitamins, flaxseed, fish or fish oil, fresh fruits and vegetables.    Psychotherapy  I agree to participate in Individual Therapy (if recommended).    Medication  If prescribed medications, I agree to take  them.  Missing doses can result in serious side effects.  I understand that drinking alcohol, or other illicit drug use, may cause potential side effects.  I will not stop my medication abruptly without first discussing it with my provider.    Staying Connected With Others  I will stay in touch with my friends, family members, and my primary care provider/team.    Use your imagination  Be creative.  We all have a creative side; it doesn t matter if it s oil painting, sand castles, or mud pies! This will also kick up the endorphins.    Witness Beauty  (AKA stop and smell the roses) Take a look outside, even in mid-winter. Notice colors, textures. Watch the squirrels and birds.     Service to others  Be of service to others.  There is always someone else in need.  By helping others we can  get out of ourselves  and remember the really important things.  This also provides opportunities for practicing all the other parts of the program.    Humor  Laugh and be silly!  Adjust your TV habits for less news and crime-drama and more comedy.    Control your stress  Try breathing deep, massage therapy, biofeedback, and meditation. Find time to relax each day.     My support system    Clinic Contact:  Phone number:    Contact 1:  Phone number:    Contact 2:  Phone number:    Hinduism/:  Phone number:    Therapist:  Phone number:    Local crisis center:    Phone number:    Other community support:  Phone number:

## 2017-12-14 NOTE — PROGRESS NOTES
SUBJECTIVE:   Monica Foley is a 32 year old female who presents to clinic today for the following health issues:      Patient presents with:  Urinary Problem: burning,  frequency and odor  RECHECK: pt was seen and treated for strep monnday, still has sore throat    Denies any possibility of pregnancy declined a pregnancy test        Started feeling sick again 4 days ago.  Body aches fever tmax 101    2 kids had strep.   [facility] started on [facility]  Fevers improved   But then thought her throat hurt more today and wanted to be rechecked.    Has a history of recurrent uti these past few months.  Last episode was a month ago  Was treated for ciprofloxacin - was growing 2 different bacteria   Burning got better no urgency or frequency  But still having odor in urine and still having a little bit of burning  [facility]ysis done [facility] diagnosed with a uti and prescribed with bactrim however patient never took because of concern about side effects made her sick last time.     No thoughts of harming self or others   No liver or kidney issues     No flank pain no vomiting  Because of persistent and worsening symptoms came in to be seen    Problem list and histories reviewed & adjusted, as indicated.  Additional history: as documented    Problem list, Medication list, Allergies, and Medical/Social/Surgical histories reviewed in [facility] and updated as appropriate.    ROS:  Constitutional, HEENT, cardiovascular, pulmonary, gi and gu systems are negative, except as otherwise noted.    OBJECTIVE:                                                    /68  Pulse 91  Temp 97.7  F (36.5  C) (Oral)  Resp 15  Wt 191 lb (86.6 kg)  SpO2 99%  Breastfeeding? No  BMI 33.51 kg/m2  Body mass index is 33.51 kg/(m^2).  GENERAL: healthy, alert and no distress  EYES: pink palpebral conjunctiva, anicteric sclera, pupils equally reactive to light and accomodation, extraocular  muscles intact full and equal.  ENT: midline nasal septum, positive  nasal congestion   Left ear:no tragal tenderness, no mastoid tenderness normal tympaninc membrane   Right ear: no tragal tenderness, no mastoid tenderness yellow, erythematous and effusion tympaninc membrane   Tonsils erythematous grade 2 no mild exudate  NECK: no adenopathy, no asymmetry or  masses  RESP: lungs clear to auscultation - no rales, rhonchi or wheezes  CV: regular rate and rhythm, normal S1 S2, no S3 or S4, no murmur, click or rub, no peripheral edema and peripheral pulses strong  ABDOMEN: soft, nontender, no hepatosplenomegaly, no masses and bowel sounds normal  MS: no gross musculoskeletal defects noted, no edema  NEURO: Normal strength and tone, mentation intact and speech normal    Diagnostic Test Results:  Results for orders placed or performed in visit on 12/14/17 (from the past 24 hour(s))   *UA reflex to Microscopic and Culture ([place] and [facility] (except [place] and [place])   Result Value Ref Range    Color Urine Yellow     Appearance Urine Clear     Glucose Urine Negative NEG^Negative mg/dL    Bilirubin Urine Negative NEG^Negative    Ketones Urine Negative NEG^Negative mg/dL    Specific Gravity Urine 1.015 1.003 - 1.035    Blood Urine Negative NEG^Negative    pH Urine 6.5 5.0 - 7.0 pH    Protein Albumin Urine Negative NEG^Negative mg/dL    Urobilinogen Urine 0.2 0.2 - 1.0 EU/dL    Nitrite Urine Positive (A) NEG^Negative    Leukocyte Esterase Urine Negative NEG^Negative    Source Midstream Urine    Urine Microscopic   Result Value Ref Range    WBC Urine O - 2 OTO2^O - 2 /HPF    RBC Urine O - 2 OTO2^O - 2 /HPF    Squamous Epithelial /LPF Urine Moderate (A) FEW^Few /LPF    Bacteria Urine Many (A) NEG^Negative /HPF        ASSESSMENT/PLAN:                                                        ICD-10-CM    1. Strep throat J02.0    2. Acute suppurative otitis media of right ear without spontaneous rupture of tympanic  membrane, recurrence not specified H66.001 levofloxacin (LEVAQUIN) 500 MG tablet   3. Dysuria R30.0 *UA reflex to Microscopic and Culture ([place] and [facility] (except [place] and [place])     Urine Microscopic     levofloxacin (LEVAQUIN) 500 MG tablet   4. Tobacco use disorder F17.200 TOBACCO CESSATION ORDER FOR    5. Nonspecific finding on examination of urine R82.90 Urine Culture Aerobic Bacterial       Concern about worsening symptoms and new diagnosis of ear infection on top of being on zithromax  Stop zithromax switch to levaquin to treat both uti and otitis media and strep  Prescribed with levaquin. Side effects discussed. Aware of the risks of increased tendon rupture with fluoroquinolones, especially if used with steroids.  We tried obtaining urine culture results from [facility] but their results aren't back yet.   Aware to come back if worsening symptoms or no relief. Come in asap if with fevers nausea vomiting or flank pain.  Recommend follow up with primary care provider if no relief, sooner if worse  Needs ear recheck with primary care provider in 2-4 weeks  Adverse reactions of medications discussed.  Over the counter medications discussed.   Aware to come back in if with worsening symptoms or if no relief despite treatment plan  Patient voiced understanding and had no further questions.     MD Danika Boone MD  [facility]

## 2017-12-14 NOTE — TELEPHONE ENCOUNTER
Per verbal order from Dr Ewing, patient is not to take the Zpak now that levaquin was ordered.   Patient will take only the levaquin for UTI, OM, and Strep.     Called patient and left detailed voicemail about above and to call clinic back if any questions about this.      Ainsley Monroy RN

## 2017-12-14 NOTE — TELEPHONE ENCOUNTER
Per verbal order from Dr Ewing, patient is not to take the Zpak now that levaquin was ordered.   Patient will take levaquin for UTI, OM, and Strep.    Called pharmacy and left detailed voicemail for them regarding this.     Ainsley Monroy RN

## 2017-12-14 NOTE — MR AVS SNAPSHOT
After Visit Summary   12/14/2017    Monica Foley    MRN: [id number]           Patient Information     Date Of Birth          1985        Visit Information        Provider Department      12/14/2017 11:40 AM Danika Ewing MD [facility]        Today's Diagnoses     Strep throat    -  1    Acute suppurative otitis media of right ear without spontaneous rupture of tympanic membrane, recurrence not specified        Dysuria        Tobacco use disorder        Nonspecific finding on examination of urine           Follow-ups after your visit        Who to contact     If you have questions or need follow up information about today's clinic visit or your schedule please contact [facility] directly at [phone].  Normal or non-critical lab and imaging results will be communicated to you by MyChart, letter or phone within 4 business days after the clinic has received the results. If you do not hear from us within 7 days, please contact the clinic through urturnhart or phone. If you have a critical or abnormal lab result, we will notify you by phone as soon as possible.  Submit refill requests through [employer] or call your pharmacy and they will forward the refill request to us. Please allow 3 business days for your refill to be completed.          Additional Information About Your Visit        MyChart Information     [employer] gives you secure access to your electronic health record. If you see a primary care provider, you can also send messages to your care team and make appointments. If you have questions, please call your primary care clinic.  If you do not have a primary care provider, please call [phone] and they will assist you.        Care EveryWhere ID     This is your Care EveryWhere ID. This could be used by other organizations to access your [place] medical records  [id number]        Your Vitals Were     Pulse Temperature Respirations Pulse  Oximetry Breastfeeding? BMI (Body Mass Index)    91 97.7  F (36.5  C) (Oral) 15 99% No 33.51 kg/m2       Blood Pressure from Last 3 Encounters:   12/14/17 105/68   10/29/17 121/75   06/26/17 116/75    Weight from Last 3 Encounters:   12/14/17 191 lb (86.6 kg)   10/29/17 187 lb 6.4 oz (85 kg)   06/26/17 195 lb (88.5 kg)              We Performed the Following     *UA reflex to Microscopic and Culture ([place] and [facility] (except [place] and [place])     TOBACCO CESSATION ORDER FOR HM     Urine Culture Aerobic Bacterial     Urine Microscopic          Today's Medication Changes          These changes are accurate as of: 12/14/17  4:31 PM.  If you have any questions, ask your nurse or doctor.               Start taking these medicines.        Dose/Directions    levofloxacin 500 MG tablet   Commonly known as:  LEVAQUIN   Used for:  Acute suppurative otitis media of right ear without spontaneous rupture of tympanic membrane, recurrence not specified, Dysuria   Started by:  Danika Ewing MD        Dose:  500 mg   Take 1 tablet (500 mg) by mouth daily   Quantity:  7 tablet   Refills:  0         Stop taking these medicines if you haven't already. Please contact your care team if you have questions.     azithromycin 250 MG tablet   Commonly known as:  ZITHROMAX   Stopped by:  Danika Ewing MD                Where to get your medicines      These medications were sent to [facility] [address] [place] [address] [address] [facility] [address] [facility] [address]  [address] [facility] [address]     Phone:  [phone]     levofloxacin 500 MG tablet                Primary Care Provider Office Phone # Fax #    [facility] [phone] [address] [facility] [address]        Equal Access to Services     BARBIE MALAVE AH: Donal antoineo Sokarla, waaxda luqadaha, qaybta kaalmada adesusanneyada, roberta contreras. So  [facility] [phone].    ATENCIÓN: Si phoebe anderson, tiene a espinoza disposición servicios gratuitos de asistencia lingüística. Brijesh soto [phone].    We comply with applicable federal civil rights laws and [place] laws. We do not discriminate on the basis of race, color, national origin, age, disability, sex, sexual orientation, or gender identity.            Thank you!     Thank you for choosing [facility]  for your [facility]. Our goal is always to provide you with excellent care. Hearing back from our patients is one way we can continue to improve our services. Please take a few minutes to complete the written survey that you may receive in the mail after your visit with us. Thank you!             Your Updated Medication List - Protect others around you: Learn how to safely use, store and throw away your medicines at www.disposemymeds.org.          This list is accurate as of: 12/14/17  4:31 PM.  Always use your most recent med list.                   Brand Name Dispense Instructions for use Diagnosis    levofloxacin 500 MG tablet    LEVAQUIN    7 tablet    Take 1 tablet (500 mg) by mouth daily    Acute suppurative otitis media of right ear without spontaneous rupture of tympanic membrane, recurrence not specified, Dysuria       PARoxetine 30 MG tablet    PAXIL    45 tablet    Take 0.5 tablets (15 mg) by mouth every morning    Depression

## 2017-12-14 NOTE — TELEPHONE ENCOUNTER
Patient states you forgot to give her a prescription for her ear infection.  Please call.    Thank you.

## 2017-12-14 NOTE — TELEPHONE ENCOUNTER
Patient is on a Z-pack and potential drug inter actions with Levaquin. Do you want to change or have her stop Z-pack and start this tomorrow or next day?    [facility]    [phone]

## 2017-12-14 NOTE — NURSING NOTE
"Chief Complaint   Patient presents with     Urinary Problem     burning,  frequency and odor     RECHECK     pt was seen and treated for strep monnday, still has sore throat       Initial /68  Pulse 91  Temp 97.7  F (36.5  C) (Oral)  Resp 15  Wt 191 lb (86.6 kg)  SpO2 99%  Breastfeeding? No  BMI 33.51 kg/m2 Estimated body mass index is 33.51 kg/(m^2) as calculated from the following:    Height as of 2/19/16: 5' 3.31\" (1.608 m).    Weight as of this encounter: 191 lb (86.6 kg).  Medication Reconciliation: complete   Valarie Engel MA      "

## 2017-12-15 ASSESSMENT — ANXIETY QUESTIONNAIRES: GAD7 TOTAL SCORE: 5

## 2017-12-15 NOTE — TELEPHONE ENCOUNTER
Strep throat diagnosed in  on Tuesday, today seen with PCP and abx changed to Levaquin for UTI, strep throat and ear infection.  Severe ear pain, difficulty talking and turning head.  Has not tried OTC analgesic yet.    Reason for Disposition    [1] SEVERE pain and [2] not improved 2 hours after analgesic medication (e.g., ibuprofen or acetaminophen)    Additional Information    [1] Reasonable improvement on antibiotic AND [2] no fever or pain (all triage questions negative)    Protocols used: EAR - OTITIS MEDIA FOLLOW-UP CALL-[facility]

## 2017-12-16 ENCOUNTER — TRANSFERRED RECORDS (OUTPATIENT)
Dept: HEALTH INFORMATION MANAGEMENT | Facility: CLINIC | Age: 32
End: 2017-12-16

## 2017-12-16 LAB
BACTERIA SPEC CULT: ABNORMAL
SPECIMEN SOURCE: ABNORMAL

## 2017-12-18 ENCOUNTER — TELEPHONE (OUTPATIENT)
Dept: FAMILY MEDICINE | Facility: CLINIC | Age: 32
End: 2017-12-18

## 2017-12-18 ENCOUNTER — NURSE TRIAGE (OUTPATIENT)
Dept: NURSING | Facility: CLINIC | Age: 32
End: 2017-12-18

## 2017-12-18 ENCOUNTER — OFFICE VISIT (OUTPATIENT)
Dept: FAMILY MEDICINE | Facility: CLINIC | Age: 32
End: 2017-12-18
Payer: COMMERCIAL

## 2017-12-18 VITALS — DIASTOLIC BLOOD PRESSURE: 72 MMHG | SYSTOLIC BLOOD PRESSURE: 124 MMHG

## 2017-12-18 DIAGNOSIS — Z09 HOSPITAL DISCHARGE FOLLOW-UP: ICD-10-CM

## 2017-12-18 DIAGNOSIS — J06.9 UPPER RESPIRATORY TRACT INFECTION, UNSPECIFIED TYPE: Primary | ICD-10-CM

## 2017-12-18 PROCEDURE — 99213 OFFICE O/P EST LOW 20 MIN: CPT | Performed by: NURSE PRACTITIONER

## 2017-12-18 NOTE — TELEPHONE ENCOUNTER
Patient was seen in clinic since then. Routing to provider who saw patient in clinic to see if this was addressed at visit.  Thanks.  Danika Ewing M.D.

## 2017-12-18 NOTE — TELEPHONE ENCOUNTER
Clinic Action Needed:  Yes, callback  FNA Triage Call  Presenting Problem:    Today Monica is waking up and face and hands are swollen.  Monica was diagnosed with strep, ear infection and uti.    Monica is requesting to speak with MD Ewing.  Please phone Monica at [phone].  Monica is currently taking levaquin.  Monica did go to ED over the weekend and received IV medication.      Routed to:  RN Pool  Please be sure to close this encounter once this patient's issue/question has been addressed.    Brenda Link RN/[facility]

## 2017-12-18 NOTE — MR AVS SNAPSHOT
After Visit Summary   12/18/2017    Monica Foley    MRN: [id number]           Patient Information     Date Of Birth          1985        Visit Information        Provider Department      12/18/2017 1:20 PM Janel Parnell APRN CNP [facility]        Today's Diagnoses     Upper respiratory tract infection, unspecified type    -  1    Hospital discharge follow-up           Follow-ups after your visit        Who to contact     If you have questions or need follow up information about today's clinic visit or your schedule please contact [facility] directly at [phone].  Normal or non-critical lab and imaging results will be communicated to you by FlyCliphart, letter or phone within 4 business days after the clinic has received the results. If you do not hear from us within 7 days, please contact the clinic through FlyCliphart or phone. If you have a critical or abnormal lab result, we will notify you by phone as soon as possible.  Submit refill requests through Powelectrics or call your pharmacy and they will forward the refill request to us. Please allow 3 business days for your refill to be completed.          Additional Information About Your Visit        MyChart Information     [employer] gives you secure access to your electronic health record. If you see a primary care provider, you can also send messages to your care team and make appointments. If you have questions, please call your primary care clinic.  If you do not have a primary care provider, please call [phone] and they will assist you.        Care EveryWhere ID     This is your Care EveryWhere ID. This could be used by other organizations to access your [place] medical records  [id number]         Blood Pressure from Last 3 Encounters:   12/18/17 124/72   12/14/17 105/68   10/29/17 121/75    Weight from Last 3 Encounters:   12/14/17 191 lb (86.6 kg)   10/29/17 187 lb 6.4 oz (85 kg)   06/26/17 195  lb (88.5 kg)              Today, you had the following     No orders found for display       Primary Care Provider Office Phone # Fax #    [facility] [phone]       [id number] [facility] [id number]        Equal Access to Services     MIRZAYRA ANANDA : Hadii aad ku hadasho Soomaali, waaxda luqadaha, qaybta kaalmada adeegyada, waxay idiin hayaan adeeg khlaura lajason contreras. So [facility] [phone].    ATENCIÓN: Si habla español, tiene a espinoza disposición servicios gratuitos de asistencia lingüística. Llame al [phone].    We comply with applicable federal civil rights laws and [place] laws. We do not discriminate on the basis of race, color, national origin, age, disability, sex, sexual orientation, or gender identity.            Thank you!     [facility] you for [facility]  [facility]. Our goal is always to provide you with excellent care. Hearing back from our patients is one way we can continue to improve our services. Please take a few minutes to complete the written survey that you may receive in the mail after your visit with us. Thank you!             Your Updated Medication List - Protect others around you: Learn how to safely use, store and throw away your medicines at www.disposemymeds.org.          This list is accurate as of: 12/18/17  1:46 PM.  Always use your most recent med list.                   Brand Name Dispense Instructions for use Diagnosis    levofloxacin 500 MG tablet    LEVAQUIN    7 tablet    Take 1 tablet (500 mg) by mouth daily    Acute suppurative otitis media of right ear without spontaneous rupture of tympanic membrane, recurrence not specified, Dysuria       PARoxetine 30 MG tablet    PAXIL    45 tablet    Take 0.5 tablets (15 mg) by mouth every morning    Depression

## 2017-12-18 NOTE — PROGRESS NOTES
SUBJECTIVE:   Monica Foley is a 32 year old female presenting with a chief complaint of follow up. Was seen last week had strep, ear infection, uti.   Seen in er recently given prednisone iv antibiotic, still on levaquin  Symptoms are all improving, no new or worsening      Past Medical History:   Diagnosis Date     RISHI (generalised anxiety disorder)      Grief 2014      suddenly from bee sting allergy     Current Outpatient Prescriptions   Medication Sig Dispense Refill     levofloxacin (LEVAQUIN) 500 MG tablet Take 1 tablet (500 mg) by mouth daily 7 tablet 0     PARoxetine (PAXIL) 30 MG tablet Take 0.5 tablets (15 mg) by mouth every morning 45 tablet 3     Social History   Substance Use Topics     Smoking status: Current Every Day Smoker     Packs/day: 0.50     Types: Cigarettes     Smokeless tobacco: Never Used     Alcohol use Yes      Comment: occasional       ROS:  CONSTITUTIONAL:NEGATIVE for fever, chills, change in weight  INTEGUMENTARY/SKIN: NEGATIVE for worrisome rashes, moles or lesions  EYES: NEGATIVE for vision changes or irritation  ENT/MOUTH: NEGATIVE for ear, mouth and throat problems  RESP:NEGATIVE for significant cough or SOB  CV: NEGATIVE for chest pain, palpitations or peripheral edema  GI: NEGATIVE for nausea, abdominal pain, heartburn, or change in bowel habits  : normal menstrual cycles    OBJECTIVE:  /72 temp 97.1 o2 97% pulse 104  GENERAL APPEARANCE:  alert and no distress  EYES: EOMI,  PERRL, conjunctiva clear  HENT: ear canals and TM's normal.  Nose and mouth without ulcers, erythema or lesions  NECK: supple, nontender, no lymphadenopathy  RESP: lungs clear to auscultation - no rales, rhonchi or wheezes  CV: regular rates and rhythm, normal S1 S2, no murmur noted    ASSESSMENT:  (J06.9) Upper respiratory tract infection, unspecified type  (primary encounter diagnosis)  (Z09) Hospital discharge follow-up    PLAN:  Continue full course of  levaquin  Fluids and Rest    Janel Parnell, APRN CNP

## 2017-12-18 NOTE — TELEPHONE ENCOUNTER
Today Monica is waking up and face and hands are swollen.  Monica was diagnosed with strep, ear infection and uti.    Monica is requesting to speak with MD Ewing.  Please phone Monica at [phone].  Monica is currently taking levaquin.  Monica did go to ED over the weekend and received IV medication.

## 2017-12-20 NOTE — TELEPHONE ENCOUNTER
I saw this patient in clinic and she had no swelling at the time, normal exam  Advised to continue on medication and call or rtc if worsening symptoms  PRADEEP Rodriguez CNP

## 2020-03-01 ENCOUNTER — HEALTH MAINTENANCE LETTER (OUTPATIENT)
Age: 35
End: 2020-03-01

## 2020-12-14 ENCOUNTER — HEALTH MAINTENANCE LETTER (OUTPATIENT)
Age: 35
End: 2020-12-14

## 2021-04-18 ENCOUNTER — HEALTH MAINTENANCE LETTER (OUTPATIENT)
Age: 36
End: 2021-04-18

## 2021-05-29 ENCOUNTER — RECORDS - HEALTHEAST (OUTPATIENT)
Dept: ADMINISTRATIVE | Facility: CLINIC | Age: 36
End: 2021-05-29

## 2021-10-02 ENCOUNTER — HEALTH MAINTENANCE LETTER (OUTPATIENT)
Age: 36
End: 2021-10-02

## 2022-05-14 ENCOUNTER — HEALTH MAINTENANCE LETTER (OUTPATIENT)
Age: 37
End: 2022-05-14

## 2022-06-02 ENCOUNTER — OFFICE VISIT (OUTPATIENT)
Dept: URGENT CARE | Facility: URGENT CARE | Age: 37
End: 2022-06-02
Payer: MEDICAID

## 2022-06-02 VITALS
TEMPERATURE: 98 F | SYSTOLIC BLOOD PRESSURE: 103 MMHG | OXYGEN SATURATION: 98 % | DIASTOLIC BLOOD PRESSURE: 75 MMHG | BODY MASS INDEX: 31.4 KG/M2 | WEIGHT: 179 LBS | HEART RATE: 90 BPM

## 2022-06-02 DIAGNOSIS — M79.602 PAIN OF LEFT UPPER EXTREMITY: ICD-10-CM

## 2022-06-02 DIAGNOSIS — R07.0 THROAT PAIN: ICD-10-CM

## 2022-06-02 DIAGNOSIS — R42 DIZZINESS: ICD-10-CM

## 2022-06-02 DIAGNOSIS — U07.1 INFECTION DUE TO 2019 NOVEL CORONAVIRUS: Primary | ICD-10-CM

## 2022-06-02 LAB
DEPRECATED S PYO AG THROAT QL EIA: NEGATIVE
GROUP A STREP BY PCR: NOT DETECTED
SARS-COV-2 RNA RESP QL NAA+PROBE: POSITIVE

## 2022-06-02 PROCEDURE — 99203 OFFICE O/P NEW LOW 30 MIN: CPT | Mod: CS | Performed by: STUDENT IN AN ORGANIZED HEALTH CARE EDUCATION/TRAINING PROGRAM

## 2022-06-02 PROCEDURE — U0005 INFEC AGEN DETEC AMPLI PROBE: HCPCS | Performed by: STUDENT IN AN ORGANIZED HEALTH CARE EDUCATION/TRAINING PROGRAM

## 2022-06-02 PROCEDURE — 87651 STREP A DNA AMP PROBE: CPT | Performed by: STUDENT IN AN ORGANIZED HEALTH CARE EDUCATION/TRAINING PROGRAM

## 2022-06-02 PROCEDURE — 93000 ELECTROCARDIOGRAM COMPLETE: CPT | Performed by: STUDENT IN AN ORGANIZED HEALTH CARE EDUCATION/TRAINING PROGRAM

## 2022-06-02 PROCEDURE — U0003 INFECTIOUS AGENT DETECTION BY NUCLEIC ACID (DNA OR RNA); SEVERE ACUTE RESPIRATORY SYNDROME CORONAVIRUS 2 (SARS-COV-2) (CORONAVIRUS DISEASE [COVID-19]), AMPLIFIED PROBE TECHNIQUE, MAKING USE OF HIGH THROUGHPUT TECHNOLOGIES AS DESCRIBED BY CMS-2020-01-R: HCPCS | Performed by: STUDENT IN AN ORGANIZED HEALTH CARE EDUCATION/TRAINING PROGRAM

## 2022-06-02 RX ORDER — ACETAMINOPHEN 325 MG/1
325-650 TABLET ORAL
COMMUNITY
Start: 2022-02-02

## 2022-06-02 RX ORDER — MECLIZINE HYDROCHLORIDE 25 MG/1
25 TABLET ORAL 3 TIMES DAILY PRN
Qty: 20 TABLET | Refills: 0 | Status: SHIPPED | OUTPATIENT
Start: 2022-06-02

## 2022-06-02 RX ORDER — IBUPROFEN 200 MG
200-600 TABLET ORAL
COMMUNITY
Start: 2022-02-02

## 2022-06-02 RX ORDER — MULTIPLE VITAMINS W/ MINERALS TAB 9MG-400MCG
1 TAB ORAL DAILY
COMMUNITY

## 2022-06-02 NOTE — PATIENT INSTRUCTIONS
Rapid strep test is negative. Strep PCR in process. Recommend ruling out Covid.    I suspect this is a viral illness causing sinus pressure and pain.     Prescription for Meclizine to take up to 3 times daily as needed for dizziness/vertigo symptoms.    Take Sudafed as needed for up to 3 days. This will help with congestion and pressure in your sinuses on the left side.    Take ibuprofen 600-800 mg every 8 hours with food for the next 3 days.    Follow up if symptoms are worsening or persisting.    Ruby Lamar, CNP

## 2022-06-02 NOTE — RESULT ENCOUNTER NOTE
Group A Streptococcus PCR is NEGATIVE  No treatment or change in treatment [facility] ED lab result Strep Group A protocol.

## 2022-06-02 NOTE — PROGRESS NOTES
Assessment & Plan     Infection due to 2019 novel coronavirus  Rapid strep negative. Covid test is positive so she would be eligible to treat with Covid medication or monoclonal antibodies. I called her to discuss the options on 6/3/22 and she said that her symptoms are improving today and feeling a lot better so she declines proceeding with Covid treatment options. Also recommend rest, pushing fluids, Tylenol as needed for body aches and monitoring symptoms. Follow up if symptoms persist or worsen.     Throat pain  - Streptococcus A Rapid Screen w/Reflex to PCR - Clinic Collect  - Group A Streptococcus PCR Throat Swab  - Symptomatic; Yes; 5/31/2022 COVID-19 Virus (Coronavirus) by PCR  - Symptomatic; Yes; 5/31/2022 COVID-19 Virus (Coronavirus) by PCR Nose    Dizziness  Prescription for medication to use as needed for vertigo symptoms. Discussed possible side effects.   - meclizine (ANTIVERT) 25 MG tablet  Dispense: 20 tablet; Refill: 0    Pain of left upper extremity  EKG is unremarkable. Suspect the pain in this arm is in part due to overuse of left arm, carrying infant routinely on that side. Recommend rest, massage, ice/heat and following up if symptoms persist or worsen.   - EKG 12-lead complete w/read - Clinics     30 minutes spent on the date of the encounter doing chart review, review of test results, interpretation of tests, patient visit and documentation       No follow-ups on file.    PRADEEP [facility]    Padmini Anderson is a 37 year old female who presents to clinic today for the following health issues:  Chief Complaint   Patient presents with     Ear Problem     Started couple days ago, had a fever with left ear pain, yesterday the left side of neck pain started, today her throat feels kind of strange, little sore on left, and left eye feels a little painful.     HPI        Review of Systems  Constitutional, HEENT, cardiovascular,  pulmonary, GI, , musculoskeletal, neuro, skin, endocrine and psych systems are negative, except as otherwise noted.      Objective    /75   Pulse 90   Temp 98  F (36.7  C) (Tympanic)   Wt 81.2 kg (179 lb)   SpO2 98%   BMI 31.40 kg/m    Physical Exam   GENERAL: alert and no distress  EYES: conjunctiva/corneas- conjunctival injection OU and pupils- normal, equal  HENT: ear canals and TM's normal, nose and mouth without ulcers or lesions  NECK: no adenopathy, no asymmetry, masses, or scars   RESP: lungs clear to auscultation - no rales, rhonchi or wheezes  CV: regular rate and rhythm, normal S1 S2, no S3 or S4, no murmur, click or rub  MS: no gross musculoskeletal defects noted, no edema  SKIN: no suspicious lesions or rashes  NEURO: Normal strength and tone, mentation intact and speech normal  PSYCH: mentation appears normal, affect normal/bright    Results for orders placed or performed in visit on 06/02/22   Symptomatic; Yes; 5/31/2022 COVID-19 Virus (Coronavirus) by PCR Nose     Status: Abnormal    Specimen: Nose; Swab   Result Value Ref Range    SARS CoV2 PCR Positive (A) Negative    Narrative    Testing was performed using the Aptima SARS-CoV-2 Assay on the  [employer] Instrument System. Additional information about this  Emergency Use Authorization (EUA) assay can be found via the Lab  Guide. This test should be ordered for the detection of SARS-CoV-2 in  individuals who meet SARS-CoV-2 clinical and/or epidemiological  criteria. Test performance is unknown in asymptomatic patients. This  test is for in vitro diagnostic use under the FDA EUA for  laboratories certified under CLIA to perform high complexity testing.  This test has not been FDA cleared or approved. A negative result  does not rule out the presence of PCR inhibitors in the specimen or  target RNA in concentration below the limit of detection for the  assay. The possibility of a false negative should be considered if  the patient's recent  exposure or clinical presentation suggests  COVID-19. This test was validated by the [facility]. This laboratory is certified under  the Clinical Laboratory Improvement Amendments of 1988 (CLIA-88) as  qualified to perform high complexity laboratory testing.   Streptococcus A Rapid Screen w/Reflex to PCR - Clinic Collect     Status: Normal    Specimen: Throat; Swab   Result Value Ref Range    Group A Strep antigen Negative Negative   Group A Streptococcus PCR Throat Swab     Status: Normal    Specimen: Throat; Swab   Result Value Ref Range    Group A strep by PCR Not Detected Not Detected    Narrative    The Xpert Xpress Strep A test, performed on the [employer] Systems, is a rapid, qualitative in vitro diagnostic test for the detection of Streptococcus pyogenes (Group A ß-hemolytic Streptococcus, Strep A) in throat swab specimens from patients with signs and symptoms of pharyngitis. The Xpert Xpress Strep A test can be used as an aid in the diagnosis of Group A Streptococcal pharyngitis. The assay is not intended to monitor treatment for Group A Streptococcus infections. The Xpert Xpress Strep A test utilizes an automated real-time polymerase chain reaction (PCR) to detect Streptococcus pyogenes DNA.

## 2022-09-03 ENCOUNTER — HEALTH MAINTENANCE LETTER (OUTPATIENT)
Age: 37
End: 2022-09-03

## 2023-06-03 ENCOUNTER — HEALTH MAINTENANCE LETTER (OUTPATIENT)
Age: 38
End: 2023-06-03

## 2023-12-20 NOTE — TELEPHONE ENCOUNTER
----- Message from Melissa Neil sent at 12/20/2023 10:36 AM EST -----  Regarding: dm eye  12/20/23 10:36 AM    Lobito, our patient Roma Connor has had Diabetic Eye Exam completed/performed. Please assist in updating the patient chart by making an External outreach to Dr Polo Escalante,[facility] facility located in . The date of service is within last 6 months.    Thank you,  [facility]        Problem: Depression - IP adult  Goal: Effects of depression will be minimized  Description: INTERVENTIONS:  - Assess impact of patient's symptoms on level of functioning, self-care needs and offer support as indicated  - Assess patient/family knowledge of depression, impact on illness and need for teaching  - Provide emotional support, presence and reassurance  - Assess for possible suicidal thoughts, ideation or self-harm   If patient expresses suicidal thoughts or statements do not leave alone, notify physician/AP immediately, initiate Suicide Precautions, and determine need for continual observation   - Initiate consults and referrals as appropriate (a mental health professional, Spiritual Care)  - Administer medication as ordered  Outcome: Progressing Since RN unable to get ahold of patient, provider ordered certified letter to be sent to patient:      Notes Recorded by Nakita Lakhani PA-C on 10/31/2017 at 2:59 PM    Your urine culture was positive for 2 types of bacteria. Bactrim DS antibiotic should cover both.    Take one tablet 2 times a day for 7 days, #14.   Call us back so we can send it to the pharmacy of your choice.   Follow up with your Primary after treatment to ensure resolution with a repeat urine test.    (Please send a letter to her, certified.)    Nakita Lakhani PA-C

## 2024-07-06 ENCOUNTER — HEALTH MAINTENANCE LETTER (OUTPATIENT)
Age: 39
End: 2024-07-06

## 2024-10-18 ENCOUNTER — APPOINTMENT (OUTPATIENT)
Dept: ULTRASOUND IMAGING | Facility: CLINIC | Age: 39
End: 2024-10-18
Payer: COMMERCIAL

## 2024-10-18 ENCOUNTER — HOSPITAL ENCOUNTER (EMERGENCY)
Facility: CLINIC | Age: 39
Discharge: HOME | End: 2024-10-18
Payer: COMMERCIAL

## 2024-10-18 VITALS
RESPIRATION RATE: 16 BRPM | TEMPERATURE: 98.2 F | SYSTOLIC BLOOD PRESSURE: 132 MMHG | DIASTOLIC BLOOD PRESSURE: 80 MMHG | OXYGEN SATURATION: 98 % | HEART RATE: 86 BPM

## 2024-10-18 DIAGNOSIS — S86.812A STRAIN OF CALF MUSCLE, LEFT, INITIAL ENCOUNTER: ICD-10-CM

## 2024-10-18 PROCEDURE — 99213 OFFICE O/P EST LOW 20 MIN: CPT

## 2024-10-18 PROCEDURE — G0463 HOSPITAL OUTPT CLINIC VISIT: HCPCS | Mod: 25

## 2024-10-18 PROCEDURE — 93926 LOWER EXTREMITY STUDY: CPT | Mod: LT

## 2024-10-18 ASSESSMENT — COLUMBIA-SUICIDE SEVERITY RATING SCALE - C-SSRS
6. HAVE YOU EVER DONE ANYTHING, STARTED TO DO ANYTHING, OR PREPARED TO DO ANYTHING TO END YOUR LIFE?: NO
2. HAVE YOU ACTUALLY HAD ANY THOUGHTS OF KILLING YOURSELF IN THE PAST MONTH?: NO
1. IN THE PAST MONTH, HAVE YOU WISHED YOU WERE DEAD OR WISHED YOU COULD GO TO SLEEP AND NOT WAKE UP?: NO

## 2024-10-18 ASSESSMENT — ACTIVITIES OF DAILY LIVING (ADL): ADLS_ACUITY_SCORE: 35

## 2024-10-18 NOTE — ED TRIAGE NOTES
"Pt going down stairs last night and heard a \"Pop\" left calf.  Painful and feels lack of circulation.         "

## 2024-10-19 NOTE — ED PROVIDER NOTES
"  History   No chief complaint on file.    HPI  Monica Foley is a 39 year old female who presents to urgent care with chief complaint of left calf pain.  Patient reports she was going down the stairs 24 hours ago when she heard a pop in her left calf with immediate pain shooting down her left leg.  Patient has been able to walk on it however has to walk on the side of her foot.  Patient has concerns for intermittent foot numbness with possible change of color and coolness.  No current symptoms.  Denies fever, chills, history of blood clots.    Allergies:  Allergies   Allergen Reactions    Bactrim Ds [Sulfamethoxazole-Trimethoprim]      Intolerance made her feel \"wierd\". Patient willing to try if no other available options but would prefer not to take this.     Penicillins        Problem List:    Patient Active Problem List    Diagnosis Date Noted    Tobacco use disorder 2015     Priority: Medium    Major depressive disorder, single episode 2015     Priority: Medium     Problem list name updated by automated process. Provider to review      Adjustment disorder with depressed mood 2014     Priority: Medium    CARDIOVASCULAR SCREENING; LDL GOAL LESS THAN 160 2014     Priority: Medium    Grief 2014     Priority: Medium    Generalized anxiety disorder      Priority: Medium     Diagnosis updated by automated process. Provider to review and confirm.          Past Medical History:    Past Medical History:   Diagnosis Date    RISHI (generalised anxiety disorder)     Grief 2014       Past Surgical History:    Past Surgical History:   Procedure Laterality Date     SECTION  ,        Family History:    Family History   Problem Relation Age of Onset    Diabetes Paternal Grandfather     Hypertension No family hx of     Hyperlipidemia No family hx of     Breast Cancer No family hx of     Prostate Cancer No family hx of     Other Cancer No family hx of     " Depression/Anxiety No family hx of     Cerebrovascular Disease No family hx of     Anesthesia Reaction No family hx of     Thyroid Disease No family hx of     Chemical Addiction No family hx of     Known Genetic Syndrome No family hx of     Obesity No family hx of     Depression No family hx of     Anxiety Disorder No family hx of     Mental Illness No family hx of     Substance Abuse No family hx of     Asthma No family hx of        Social History:  Marital Status:   [2]  Social History     Tobacco Use    Smoking status: Every Day     Current packs/day: 0.50     Types: Cigarettes    Smokeless tobacco: Never   Substance Use Topics    Alcohol use: Yes     Comment: occasional    Drug use: No        Medications:    acetaminophen (TYLENOL) 325 MG tablet  ibuprofen (ADVIL/MOTRIN) 200 MG tablet  levofloxacin (LEVAQUIN) 500 MG tablet  meclizine (ANTIVERT) 25 MG tablet  multivitamin w/minerals (MULTI-VITAMIN) tablet  PARoxetine (PAXIL) 30 MG tablet          Review of Systems   All other systems reviewed and are negative.      Physical Exam   BP: 132/80  Pulse: 86  Temp: 98.2  F (36.8  C)  Resp: 16  SpO2: 98 %      Physical Exam  Vitals and nursing note reviewed.   Constitutional:       General: She is not in acute distress.     Appearance: Normal appearance. She is not ill-appearing.   Cardiovascular:      Rate and Rhythm: Normal rate.      Pulses: Normal pulses.   Pulmonary:      Effort: Pulmonary effort is normal.   Musculoskeletal:      Cervical back: Neck supple.      Right lower leg: No swelling. No edema.      Left lower leg: Tenderness (Mild posterior proximal calf tenderness to palpation) present. No swelling or deformity. No edema.      Left ankle: No swelling.      Left Achilles Tendon: Normal. Wright's test negative.      Left foot: No swelling. Normal pulse.   Skin:     Capillary Refill: Capillary refill takes less than 2 seconds.      Findings: No bruising, erythema or rash.   Neurological:      Mental  Status: She is alert.   Psychiatric:         Mood and Affect: Mood normal.         Behavior: Behavior normal.         ED Course        Procedures        Results for orders placed or performed during the hospital encounter of 10/18/24 (from the past 24 hour(s))   US Lower Extremity Arterial Duplex Left    Narrative    [facility]  LOCATION: [facility]  DATE: 10/18/2024    EXAM: ARTERIAL DUPLEX LEFT LOWER EXTREMITY    INDICATION: Trauma, cold left foot    TECHNIQUE: Duplex imaging is performed utilizing gray-scale, two-dimensional images and color-flow imaging. Doppler waveform analysis and spectral Doppler imaging is also performed.    COMPARISON: None.    FINDINGS:   WAVEFORMS:  Left leg:  Multiphasic    DUPLEX ARTERIAL ULTRASOUND FINDINGS (velocities in cm/s):   LEFT  CFA: 123  PFA: 88  SFA prox: 107  SFA mid: 135  SFA dist: 97  Pop:60   PT: 76  AT: 41  DP: Not obtained  Peroneal: 37      Impression    IMPRESSION:  1.  LEFT LOWER EXTREMITY: Normal study.       Medications - No data to display    Assessments & Plan (with Medical Decision Making)     I have reviewed the nursing notes.    I have reviewed the findings, diagnosis, plan and need for follow up with the patient.    Medical Decision Making  Patient is a 39 year old female who presents to urgent care with chief complaint of left calf pain.  Patient reports she was going down the stairs 24 hours ago when she heard a pop in her left calf with immediate pain shooting down her left leg.  Patient has been able to walk on it however has to walk on the side of her foot.  Patient has concerns for intermittent foot numbness with possible change of color and coolness.  No current symptoms.  Denies fever, chills, history of blood clots.    Exam above.  Mild left calf tenderness to palpation, worse with weightbearing.  Gillies tendon intact.  No visible skin changes or edema.    I educated the patient on probable gastrocnemius cyst  strain.  I recommended walking boot or heel lift however patient declined.  Plan for ACE pressure wrap for the next 5 to 7 days.  I recommended ice and elevation.  Patient is most likely experiencing some numbness and coolness of lower extremity due to secondary hematoma formation.  I recommended follow-up with orthopedics if symptoms are not improving over the next 5 to 7 days.  Arterial ultrasound was performed today which showed proper blood flow.    Prior to making a final disposition on this patient the results of patient's tests and other diagnostic studies were discussed with the patient. All questions were answered. Patient expressed understanding of the plan and was amenable to it.     Disclaimer: This note consists of symbols derived from keyboarding, dictation and/or voice recognition software. As a result, there may be errors in the script that have gone undetected. Please consider this when interpreting information found in this chart.        Discharge Medication List as of 10/18/2024  8:00 PM          Final diagnoses:   Strain of calf muscle, left, initial encounter       10/18/2024   [facility] EMERGENCY DEPT       Suzanne Alvarado PA-C  10/20/24 102

## 2025-03-08 ENCOUNTER — HEALTH MAINTENANCE LETTER (OUTPATIENT)
Age: 40
End: 2025-03-08

## 2025-07-13 ENCOUNTER — HEALTH MAINTENANCE LETTER (OUTPATIENT)
Age: 40
End: 2025-07-13